# Patient Record
Sex: MALE
[De-identification: names, ages, dates, MRNs, and addresses within clinical notes are randomized per-mention and may not be internally consistent; named-entity substitution may affect disease eponyms.]

---

## 2023-02-08 PROBLEM — Z00.00 ENCOUNTER FOR PREVENTIVE HEALTH EXAMINATION: Status: ACTIVE | Noted: 2023-02-08

## 2023-02-22 ENCOUNTER — APPOINTMENT (OUTPATIENT)
Dept: INTERNAL MEDICINE | Facility: CLINIC | Age: 63
End: 2023-02-22
Payer: MEDICARE

## 2023-02-22 ENCOUNTER — NON-APPOINTMENT (OUTPATIENT)
Age: 63
End: 2023-02-22

## 2023-02-22 VITALS
SYSTOLIC BLOOD PRESSURE: 118 MMHG | RESPIRATION RATE: 17 BRPM | BODY MASS INDEX: 29.25 KG/M2 | HEART RATE: 89 BPM | WEIGHT: 149 LBS | OXYGEN SATURATION: 97 % | DIASTOLIC BLOOD PRESSURE: 70 MMHG | HEIGHT: 60 IN | TEMPERATURE: 98.5 F

## 2023-02-22 DIAGNOSIS — K43.9 VENTRAL HERNIA W/OUT OBSTRUCTION OR GANGRENE: ICD-10-CM

## 2023-02-22 DIAGNOSIS — K40.90 UNILATERAL INGUINAL HERNIA, W/OUT OBSTRUCTION OR GANGRENE, NOT SPECIFIED AS RECURRENT: ICD-10-CM

## 2023-02-22 DIAGNOSIS — Z78.9 OTHER SPECIFIED HEALTH STATUS: ICD-10-CM

## 2023-02-22 PROCEDURE — 99204 OFFICE O/P NEW MOD 45 MIN: CPT | Mod: 25

## 2023-02-22 PROCEDURE — 93000 ELECTROCARDIOGRAM COMPLETE: CPT

## 2023-02-22 RX ORDER — METFORMIN ER 750 MG 750 MG/1
750 TABLET ORAL
Refills: 0 | Status: DISCONTINUED | COMMUNITY

## 2023-02-22 RX ORDER — MULTIVIT-MIN/FOLIC/VIT K/LYCOP 400-300MCG
25 MCG TABLET ORAL DAILY
Qty: 90 | Refills: 1 | Status: ACTIVE | COMMUNITY
Start: 1900-01-01 | End: 1900-01-01

## 2023-02-23 PROBLEM — K43.9 VENTRAL HERNIA: Status: ACTIVE | Noted: 2023-02-23

## 2023-02-23 PROBLEM — Z78.9 DOES NOT USE TOBACCO: Status: ACTIVE | Noted: 2023-02-23

## 2023-02-23 PROBLEM — K40.90 INGUINAL HERNIA: Status: ACTIVE | Noted: 2023-02-23

## 2023-03-08 ENCOUNTER — APPOINTMENT (OUTPATIENT)
Dept: INTERNAL MEDICINE | Facility: CLINIC | Age: 63
End: 2023-03-08
Payer: MEDICARE

## 2023-03-08 VITALS
HEIGHT: 60 IN | DIASTOLIC BLOOD PRESSURE: 82 MMHG | RESPIRATION RATE: 17 BRPM | WEIGHT: 149 LBS | HEART RATE: 81 BPM | OXYGEN SATURATION: 98 % | BODY MASS INDEX: 29.25 KG/M2 | TEMPERATURE: 98.3 F | SYSTOLIC BLOOD PRESSURE: 128 MMHG

## 2023-03-08 DIAGNOSIS — E11.9 TYPE 2 DIABETES MELLITUS W/OUT COMPLICATIONS: ICD-10-CM

## 2023-03-08 DIAGNOSIS — Z12.5 ENCOUNTER FOR SCREENING FOR MALIGNANT NEOPLASM OF PROSTATE: ICD-10-CM

## 2023-03-08 DIAGNOSIS — E55.9 VITAMIN D DEFICIENCY, UNSPECIFIED: ICD-10-CM

## 2023-03-08 DIAGNOSIS — R06.2 WHEEZING: ICD-10-CM

## 2023-03-08 DIAGNOSIS — E03.9 HYPOTHYROIDISM, UNSPECIFIED: ICD-10-CM

## 2023-03-08 DIAGNOSIS — E53.8 DEFICIENCY OF OTHER SPECIFIED B GROUP VITAMINS: ICD-10-CM

## 2023-03-08 PROCEDURE — 99213 OFFICE O/P EST LOW 20 MIN: CPT | Mod: 25

## 2023-03-08 PROCEDURE — 36415 COLL VENOUS BLD VENIPUNCTURE: CPT

## 2023-03-13 PROBLEM — E11.9 DIABETES MELLITUS, TYPE 2: Status: ACTIVE | Noted: 2023-02-22

## 2023-03-13 PROBLEM — R06.2 WHEEZING: Status: ACTIVE | Noted: 2023-03-08

## 2023-03-13 PROBLEM — E03.9 HYPOTHYROIDISM: Status: ACTIVE | Noted: 2023-02-22

## 2023-03-13 PROBLEM — E55.9 VITAMIN D DEFICIENCY: Status: ACTIVE | Noted: 2023-02-22

## 2023-03-13 PROBLEM — E53.8 VITAMIN B12 DEFICIENCY (NON ANEMIC): Status: ACTIVE | Noted: 2023-03-08

## 2023-03-13 NOTE — PLAN
[FreeTextEntry1] : check fasting blood work\par \par RX for albuterol HFA sent to pharmacy electronically

## 2023-03-13 NOTE — PHYSICAL EXAM
[Normal] : normal gait, coordination grossly intact, no focal deficits and deep tendon reflexes were 2+ and symmetric [de-identified] : ilateral inguinal hernias (left larger than right), ventral hernia, diasthesis recti [FreeTextEntry1] : done by Dr. Barrett (gastroenterology)

## 2023-03-13 NOTE — HISTORY OF PRESENT ILLNESS
[FreeTextEntry1] : fasting blood work\par RX refill [FreeTextEntry8] : Pt. is a 64 y/o male presents for fasting blood work not performed at new patient exam. Pt. has diabetes. Pt. hemoglobin A1C has been monitored by patient. Pt. advised that there are certain medications that the patient must be on during diabetes. Pt. takes Metformin b.i.d. Pt. had a hernia. Pt. does to podiatry twice a year in [place]. Pt. never had any eye problems from he diabetes. Pt. denies heart problems. Pt. has no surgical history. Pt. saw Alexis Barrett in [place]. Pt. denies smoking or drinking. Pt. is in real estate. Pt. has been in [place]. Pt. denies family history of medical problems. Pt. father had passed. Pt. is up-to-date with colonoscopy. Pt flu shot received last year. Pt. Zoraida Renee. Pt. advised to get pneumonia shot. Pt. has not Shingles vaccine. Pt. had chickenpox when he was a kid. Pt. PE is normal except for hernia. Pt. advised to have possible hernia repair surgery. Pt. had rectal exam from Dr. Martins. Pt. requests refill on all current medications medications. Pt. medication refills sent to [facility] in [place]. Greater than 50% of the time was spent discussing diet and exercising, taking meds as prescribed, and washing, not touching their face, sneezing in to their elbow, stay home with only mild symptoms, social distancing, and quarantining themselves away from others.\par

## 2023-03-18 LAB
CREAT SPEC-SCNC: 50 MG/DL
MICROALBUMIN 24H UR DL<=1MG/L-MCNC: <1.2 MG/DL
MICROALBUMIN/CREAT 24H UR-RTO: NORMAL MG/G

## 2023-07-09 ENCOUNTER — RX RENEWAL (OUTPATIENT)
Age: 63
End: 2023-07-09

## 2023-07-09 RX ORDER — ALBUTEROL SULFATE 90 UG/1
108 (90 BASE) INHALANT RESPIRATORY (INHALATION)
Qty: 8.5 | Refills: 0 | Status: ACTIVE | COMMUNITY
Start: 2023-03-08 | End: 1900-01-01

## 2023-07-13 LAB
25(OH)D3 SERPL-MCNC: 18.6 NG/ML
ALBUMIN SERPL ELPH-MCNC: 5 G/DL
ALP BLD-CCNC: 45 U/L
ALT SERPL-CCNC: 40 U/L
ANION GAP SERPL CALC-SCNC: 17 MMOL/L
AST SERPL-CCNC: 33 U/L
BILIRUB SERPL-MCNC: 0.7 MG/DL
BUN SERPL-MCNC: 13 MG/DL
CALCIUM SERPL-MCNC: 10.2 MG/DL
CHLORIDE SERPL-SCNC: 101 MMOL/L
CHOLEST SERPL-MCNC: 198 MG/DL
CO2 SERPL-SCNC: 20 MMOL/L
CREAT SERPL-MCNC: 1.1 MG/DL
EGFR: 74 ML/MIN/1.73M2
ESTIMATED AVERAGE GLUCOSE: 166 MG/DL
GLUCOSE SERPL-MCNC: 119 MG/DL
HBA1C MFR BLD HPLC: 7.4 %
HDLC SERPL-MCNC: 41 MG/DL
LDLC SERPL CALC-MCNC: 121 MG/DL
NONHDLC SERPL-MCNC: 158 MG/DL
POTASSIUM SERPL-SCNC: 4.7 MMOL/L
PROT SERPL-MCNC: 7.8 G/DL
PSA SERPL-MCNC: 0.93 NG/ML
SODIUM SERPL-SCNC: 138 MMOL/L
TRIGL SERPL-MCNC: 209 MG/DL
TSH SERPL-ACNC: 3.16 UIU/ML
VIT B12 SERPL-MCNC: 167 PG/ML

## 2023-07-18 ENCOUNTER — NON-APPOINTMENT (OUTPATIENT)
Age: 63
End: 2023-07-18

## 2023-09-17 ENCOUNTER — RX RENEWAL (OUTPATIENT)
Age: 63
End: 2023-09-17

## 2023-09-17 RX ORDER — LOSARTAN POTASSIUM 50 MG/1
50 TABLET, FILM COATED ORAL
Qty: 90 | Refills: 0 | Status: ACTIVE | COMMUNITY
Start: 2023-03-08 | End: 1900-01-01

## 2023-09-17 RX ORDER — METFORMIN HYDROCHLORIDE 500 MG/1
500 TABLET, COATED ORAL TWICE DAILY
Qty: 360 | Refills: 0 | Status: ACTIVE | COMMUNITY
Start: 2023-07-09 | End: 1900-01-01

## 2023-09-17 RX ORDER — LEVOTHYROXINE SODIUM 0.07 MG/1
75 TABLET ORAL
Qty: 90 | Refills: 0 | Status: ACTIVE | COMMUNITY
Start: 2023-07-09 | End: 1900-01-01

## 2023-09-17 RX ORDER — SIMVASTATIN 20 MG/1
20 TABLET, FILM COATED ORAL
Qty: 90 | Refills: 0 | Status: ACTIVE | COMMUNITY
Start: 2023-07-09 | End: 1900-01-01

## 2023-12-10 ENCOUNTER — RX RENEWAL (OUTPATIENT)
Age: 63
End: 2023-12-10

## 2024-01-10 ENCOUNTER — RX RENEWAL (OUTPATIENT)
Age: 64
End: 2024-01-10

## 2024-03-13 ENCOUNTER — OFFICE (OUTPATIENT)
Dept: URBAN - METROPOLITAN AREA CLINIC 109 | Facility: CLINIC | Age: 64
Setting detail: OPHTHALMOLOGY
End: 2024-03-13
Payer: MEDICARE

## 2024-03-13 DIAGNOSIS — H40.1131: ICD-10-CM

## 2024-03-13 DIAGNOSIS — H25.13: ICD-10-CM

## 2024-03-13 DIAGNOSIS — E11.3291: ICD-10-CM

## 2024-03-13 DIAGNOSIS — H52.4: ICD-10-CM

## 2024-03-13 PROCEDURE — 92014 COMPRE OPH EXAM EST PT 1/>: CPT | Performed by: OPHTHALMOLOGY

## 2024-03-13 PROCEDURE — 92133 CPTRZD OPH DX IMG PST SGM ON: CPT | Performed by: OPHTHALMOLOGY

## 2024-03-13 PROCEDURE — 92015 DETERMINE REFRACTIVE STATE: CPT | Performed by: OPHTHALMOLOGY

## 2024-03-13 ASSESSMENT — REFRACTION_MANIFEST
OS_ADD: +2.50
OS_SPHERE: +3.00
OD_VA1: 20/40+2
OD_ADD: +2.50
OD_AXIS: 90
OD_CYLINDER: -0.50
OD_SPHERE: +1.00

## 2024-03-13 ASSESSMENT — REFRACTION_CURRENTRX
OS_OVR_VA: 20/
OD_SPHERE: +3.00
OD_OVR_VA: 20/
OD_CYLINDER: -0.50
OD_ADD: +2.50
OS_ADD: +2.50
OD_AXIS: 90
OS_SPHERE: +3.00

## 2024-03-13 ASSESSMENT — SPHEQUIV_DERIVED: OD_SPHEQUIV: 0.75

## 2024-04-10 ENCOUNTER — RX ONLY (RX ONLY)
Age: 64
End: 2024-04-10

## 2024-04-10 ENCOUNTER — OFFICE (OUTPATIENT)
Dept: URBAN - METROPOLITAN AREA CLINIC 109 | Facility: CLINIC | Age: 64
Setting detail: OPHTHALMOLOGY
End: 2024-04-10
Payer: MEDICARE

## 2024-04-10 DIAGNOSIS — H40.1131: ICD-10-CM

## 2024-04-10 DIAGNOSIS — H25.13: ICD-10-CM

## 2024-04-10 DIAGNOSIS — H40.033: ICD-10-CM

## 2024-04-10 DIAGNOSIS — E11.3291: ICD-10-CM

## 2024-04-10 PROCEDURE — 92020 GONIOSCOPY: CPT | Performed by: OPHTHALMOLOGY

## 2024-04-10 PROCEDURE — 99214 OFFICE O/P EST MOD 30 MIN: CPT | Performed by: OPHTHALMOLOGY

## 2024-06-10 ENCOUNTER — OFFICE (OUTPATIENT)
Dept: URBAN - METROPOLITAN AREA CLINIC 109 | Facility: CLINIC | Age: 64
Setting detail: OPHTHALMOLOGY
End: 2024-06-10
Payer: MEDICARE

## 2024-06-10 DIAGNOSIS — H25.12: ICD-10-CM

## 2024-06-10 DIAGNOSIS — H25.13: ICD-10-CM

## 2024-06-10 DIAGNOSIS — H40.1131: ICD-10-CM

## 2024-06-10 DIAGNOSIS — E11.3291: ICD-10-CM

## 2024-06-10 PROBLEM — H25.11 CATARACT SENILE NUCLEAR SCLEROSIS; RIGHT EYE, LEFT EYE, BOTH EYES: Status: ACTIVE | Noted: 2024-06-10

## 2024-06-10 PROCEDURE — 92136 OPHTHALMIC BIOMETRY: CPT | Mod: LT | Performed by: OPHTHALMOLOGY

## 2024-06-10 PROCEDURE — 92134 CPTRZ OPH DX IMG PST SGM RTA: CPT | Performed by: OPHTHALMOLOGY

## 2024-06-10 PROCEDURE — 99213 OFFICE O/P EST LOW 20 MIN: CPT | Performed by: OPHTHALMOLOGY

## 2024-06-10 ASSESSMENT — CONFRONTATIONAL VISUAL FIELD TEST (CVF)
OS_FINDINGS: FULL
OD_FINDINGS: FULL

## 2024-08-28 ENCOUNTER — OFFICE (OUTPATIENT)
Dept: URBAN - METROPOLITAN AREA CLINIC 109 | Facility: CLINIC | Age: 64
Setting detail: OPHTHALMOLOGY
End: 2024-08-28
Payer: MEDICARE

## 2024-08-28 DIAGNOSIS — H25.12: ICD-10-CM

## 2024-08-28 DIAGNOSIS — H25.11: ICD-10-CM

## 2024-08-28 DIAGNOSIS — H25.13: ICD-10-CM

## 2024-08-28 PROCEDURE — 99213 OFFICE O/P EST LOW 20 MIN: CPT | Performed by: OPHTHALMOLOGY

## 2024-08-28 ASSESSMENT — CONFRONTATIONAL VISUAL FIELD TEST (CVF)
OS_FINDINGS: FULL
OD_FINDINGS: FULL

## 2024-09-03 ENCOUNTER — AMBULATORY SURGERY CENTER (OUTPATIENT)
Dept: URBAN - METROPOLITAN AREA SURGERY 19 | Facility: SURGERY | Age: 64
Setting detail: OPHTHALMOLOGY
End: 2024-09-03
Payer: MEDICARE

## 2024-09-03 DIAGNOSIS — H25.12: ICD-10-CM

## 2024-09-03 DIAGNOSIS — H40.1121: ICD-10-CM

## 2024-09-03 PROCEDURE — 66991 XCAPSL CTRC RMVL INSJ 1+: CPT | Mod: LT | Performed by: OPHTHALMOLOGY

## 2024-09-04 ENCOUNTER — OFFICE (OUTPATIENT)
Dept: URBAN - METROPOLITAN AREA CLINIC 109 | Facility: CLINIC | Age: 64
Setting detail: OPHTHALMOLOGY
End: 2024-09-04
Payer: MEDICARE

## 2024-09-04 DIAGNOSIS — Z96.1: ICD-10-CM

## 2024-09-04 PROCEDURE — 99024 POSTOP FOLLOW-UP VISIT: CPT | Performed by: OPTOMETRIST

## 2024-09-26 ENCOUNTER — OFFICE (OUTPATIENT)
Dept: URBAN - METROPOLITAN AREA CLINIC 109 | Facility: CLINIC | Age: 64
Setting detail: OPHTHALMOLOGY
End: 2024-09-26
Payer: MEDICARE

## 2024-09-26 DIAGNOSIS — Z96.1: ICD-10-CM

## 2024-09-26 DIAGNOSIS — H25.13: ICD-10-CM

## 2024-09-26 DIAGNOSIS — H25.11: ICD-10-CM

## 2024-09-26 PROCEDURE — 99213 OFFICE O/P EST LOW 20 MIN: CPT | Mod: 24 | Performed by: OPHTHALMOLOGY

## 2024-09-26 PROCEDURE — 99024 POSTOP FOLLOW-UP VISIT: CPT | Performed by: OPHTHALMOLOGY

## 2024-09-26 PROCEDURE — 92136 OPHTHALMIC BIOMETRY: CPT | Mod: RT | Performed by: OPHTHALMOLOGY

## 2024-09-26 ASSESSMENT — CONFRONTATIONAL VISUAL FIELD TEST (CVF)
OS_FINDINGS: FULL
OD_FINDINGS: FULL

## 2024-11-26 ENCOUNTER — OFFICE (OUTPATIENT)
Dept: URBAN - METROPOLITAN AREA CLINIC 109 | Facility: CLINIC | Age: 64
Setting detail: OPHTHALMOLOGY
End: 2024-11-26
Payer: MEDICARE

## 2024-11-26 DIAGNOSIS — H25.11: ICD-10-CM

## 2024-11-26 DIAGNOSIS — H40.1131: ICD-10-CM

## 2024-11-26 PROCEDURE — 99214 OFFICE O/P EST MOD 30 MIN: CPT | Mod: 57 | Performed by: OPHTHALMOLOGY

## 2024-11-26 PROCEDURE — 92020 GONIOSCOPY: CPT | Performed by: OPHTHALMOLOGY

## 2024-11-26 ASSESSMENT — PACHYMETRY
OD_CT_UM: 515
OS_CT_CORRECTION: 2
OD_CT_CORRECTION: 2
OS_CT_UM: 515

## 2024-11-26 ASSESSMENT — KERATOMETRY
OD_K1POWER_DIOPTERS: 42.25
OS_AXISANGLE_DEGREES: 039
OS_K1POWER_DIOPTERS: 42.25
OD_AXISANGLE_DEGREES: 162
OD_K2POWER_DIOPTERS: 42.50
OS_K2POWER_DIOPTERS: 42.50

## 2024-11-26 ASSESSMENT — REFRACTION_CURRENTRX
OD_SPHERE: +3.00
OD_AXIS: 90
OS_OVR_VA: 20/
OS_ADD: +2.50
OD_OVR_VA: 20/
OD_CYLINDER: -0.50
OD_ADD: +2.50
OS_SPHERE: +3.00

## 2024-11-26 ASSESSMENT — REFRACTION_MANIFEST
OD_ADD: +2.50
OD_SPHERE: +1.00
OD_AXIS: 090
OD_CYLINDER: -0.50
OD_AXIS: 90
OD_SPHERE: +1.00
OD_CYLINDER: -0.50
OD_VA1: 20/40+2
OS_ADD: +2.50
OS_SPHERE: +3.00
OD_VA1: 20/NI

## 2024-11-26 ASSESSMENT — TONOMETRY
OS_IOP_MMHG: 15
OD_IOP_MMHG: 15

## 2024-11-26 ASSESSMENT — REFRACTION_AUTOREFRACTION
OD_AXIS: 085
OS_AXIS: 090
OD_SPHERE: +0.50
OS_SPHERE: +0.50
OD_CYLINDER: -0.50
OS_CYLINDER: -0.50

## 2024-11-26 ASSESSMENT — VISUAL ACUITY
OS_BCVA: 20/40
OD_BCVA: 20/20-1

## 2024-11-26 ASSESSMENT — CONFRONTATIONAL VISUAL FIELD TEST (CVF)
OS_FINDINGS: FULL
OD_FINDINGS: FULL

## 2024-12-04 ENCOUNTER — RX ONLY (RX ONLY)
Age: 64
End: 2024-12-04

## 2024-12-06 ENCOUNTER — RX ONLY (RX ONLY)
Age: 64
End: 2024-12-06

## 2024-12-06 RX ORDER — KETOROLAC TROMETHAMINE 5 MG/ML
SOLUTION/ DROPS OPHTHALMIC
Qty: 5 | Refills: 3 | Status: ACTIVE | OUTPATIENT

## 2024-12-10 ENCOUNTER — AMBULATORY SURGERY CENTER (OUTPATIENT)
Dept: URBAN - METROPOLITAN AREA SURGERY 19 | Facility: SURGERY | Age: 64
Setting detail: OPHTHALMOLOGY
End: 2024-12-10
Payer: MEDICARE

## 2024-12-10 DIAGNOSIS — H25.11: ICD-10-CM

## 2024-12-10 DIAGNOSIS — H40.1111: ICD-10-CM

## 2024-12-10 PROCEDURE — 66991 XCAPSL CTRC RMVL INSJ 1+: CPT | Mod: RT | Performed by: OPHTHALMOLOGY

## 2024-12-11 ENCOUNTER — OFFICE (OUTPATIENT)
Dept: URBAN - METROPOLITAN AREA CLINIC 109 | Facility: CLINIC | Age: 64
Setting detail: OPHTHALMOLOGY
End: 2024-12-11
Payer: MEDICARE

## 2024-12-11 ENCOUNTER — RX ONLY (RX ONLY)
Age: 64
End: 2024-12-11

## 2024-12-11 DIAGNOSIS — Z96.1: ICD-10-CM

## 2024-12-11 PROCEDURE — 99024 POSTOP FOLLOW-UP VISIT: CPT | Performed by: OPTOMETRIST

## 2024-12-11 ASSESSMENT — VISUAL ACUITY
OS_BCVA: 20/40
OD_BCVA: 20/20-2

## 2024-12-11 ASSESSMENT — REFRACTION_MANIFEST
OS_ADD: +2.50
OD_VA1: 20/40+2
OD_AXIS: 090
OD_SPHERE: +1.00
OD_AXIS: 90
OD_CYLINDER: -0.50
OD_ADD: +2.50
OS_SPHERE: +3.00
OD_VA1: 20/NI
OD_CYLINDER: -0.50
OD_SPHERE: +1.00

## 2024-12-11 ASSESSMENT — KERATOMETRY
OS_AXISANGLE_DEGREES: 039
OD_AXISANGLE_DEGREES: 162
OS_K1POWER_DIOPTERS: 42.25
OD_K1POWER_DIOPTERS: 42.25
OS_K2POWER_DIOPTERS: 42.50
OD_K2POWER_DIOPTERS: 42.50

## 2024-12-11 ASSESSMENT — PACHYMETRY
OD_CT_CORRECTION: 2
OS_CT_UM: 515
OS_CT_CORRECTION: 2
OD_CT_UM: 515

## 2024-12-11 ASSESSMENT — REFRACTION_CURRENTRX
OS_SPHERE: +3.00
OS_OVR_VA: 20/
OS_ADD: +2.50
OD_AXIS: 90
OD_CYLINDER: -0.50
OD_SPHERE: +3.00
OD_ADD: +2.50
OD_OVR_VA: 20/

## 2024-12-11 ASSESSMENT — REFRACTION_AUTOREFRACTION
OD_AXIS: 035
OS_AXIS: 142
OS_SPHERE: -0.25
OD_CYLINDER: -0.50
OD_SPHERE: +0.50
OS_CYLINDER: -0.25

## 2024-12-11 ASSESSMENT — TONOMETRY
OS_IOP_MMHG: 12
OD_IOP_MMHG: 12

## 2025-01-02 ENCOUNTER — OFFICE (OUTPATIENT)
Dept: URBAN - METROPOLITAN AREA CLINIC 109 | Facility: CLINIC | Age: 65
Setting detail: OPHTHALMOLOGY
End: 2025-01-02
Payer: MEDICARE

## 2025-01-02 ENCOUNTER — RX ONLY (RX ONLY)
Age: 65
End: 2025-01-02

## 2025-01-02 DIAGNOSIS — Z96.1: ICD-10-CM

## 2025-01-02 PROCEDURE — 99024 POSTOP FOLLOW-UP VISIT: CPT | Performed by: OPTOMETRIST

## 2025-01-02 RX ORDER — TIMOLOL MALEATE 5 MG/ML
SOLUTION/ DROPS OPHTHALMIC
Qty: 7.5 | Refills: 4 | Status: ACTIVE | OUTPATIENT

## 2025-01-02 ASSESSMENT — PACHYMETRY
OS_CT_CORRECTION: 2
OD_CT_UM: 515
OD_CT_CORRECTION: 2
OS_CT_UM: 515

## 2025-01-02 ASSESSMENT — REFRACTION_MANIFEST
OS_ADD: +2.50
OD_ADD: +2.50
OD_SPHERE: PLANO
OS_SPHERE: +3.00
OD_AXIS: 90
OS_ADD: +2.50
OS_SPHERE: PLANO
OD_CYLINDER: -0.50
OD_VA1: 20/40+2
OD_ADD: +2.50
OD_SPHERE: +1.00

## 2025-01-02 ASSESSMENT — REFRACTION_AUTOREFRACTION
OD_SPHERE: +0.25
OS_AXIS: 95
OS_SPHERE: +0.25
OS_CYLINDER: -0.25
OD_AXIS: 57
OD_CYLINDER: -0.50

## 2025-01-02 ASSESSMENT — REFRACTION_CURRENTRX
OS_ADD: +2.50
OD_AXIS: 90
OD_SPHERE: +3.00
OD_ADD: +2.50
OS_SPHERE: +3.00
OD_OVR_VA: 20/
OS_OVR_VA: 20/
OD_CYLINDER: -0.50

## 2025-01-02 ASSESSMENT — VISUAL ACUITY
OD_BCVA: 20/20
OS_BCVA: 20/20

## 2025-01-02 ASSESSMENT — KERATOMETRY
OS_K2POWER_DIOPTERS: 42.50
OS_AXISANGLE_DEGREES: 039
OD_K2POWER_DIOPTERS: 42.50
OS_K1POWER_DIOPTERS: 42.25
OD_AXISANGLE_DEGREES: 162
OD_K1POWER_DIOPTERS: 42.25

## 2025-01-02 ASSESSMENT — TONOMETRY
OS_IOP_MMHG: 16
OD_IOP_MMHG: 15

## 2025-05-02 ENCOUNTER — OFFICE (OUTPATIENT)
Dept: URBAN - METROPOLITAN AREA CLINIC 109 | Facility: CLINIC | Age: 65
Setting detail: OPHTHALMOLOGY
End: 2025-05-02
Payer: MEDICARE

## 2025-05-02 VITALS — HEIGHT: 60 IN

## 2025-05-02 DIAGNOSIS — H40.033: ICD-10-CM

## 2025-05-02 DIAGNOSIS — H40.1131: ICD-10-CM

## 2025-05-02 PROCEDURE — DEFER/DELA DEFER/DELAY 30 DAY: Performed by: OPTOMETRIST

## 2025-05-02 PROCEDURE — 99213 OFFICE O/P EST LOW 20 MIN: CPT | Performed by: OPTOMETRIST

## 2025-05-02 ASSESSMENT — REFRACTION_CURRENTRX
OS_ADD: +2.50
OD_ADD: +2.50
OD_SPHERE: +3.00
OS_SPHERE: +3.00
OD_CYLINDER: -0.50
OD_AXIS: 90
OD_OVR_VA: 20/
OS_OVR_VA: 20/

## 2025-05-02 ASSESSMENT — TONOMETRY
OS_IOP_MMHG: 14
OD_IOP_MMHG: 14

## 2025-05-02 ASSESSMENT — PACHYMETRY
OD_CT_CORRECTION: 2
OS_CT_UM: 515
OD_CT_UM: 515
OS_CT_CORRECTION: 2

## 2025-05-02 ASSESSMENT — REFRACTION_AUTOREFRACTION
OD_SPHERE: +0.25
OD_AXIS: 57
OS_CYLINDER: -0.25
OS_SPHERE: +0.25
OD_CYLINDER: -0.50
OS_AXIS: 95

## 2025-05-02 ASSESSMENT — REFRACTION_MANIFEST
OS_SPHERE: PLANO
OD_SPHERE: +1.00
OD_ADD: +2.50
OD_CYLINDER: -0.50
OD_SPHERE: PLANO
OD_ADD: +2.50
OD_VA1: 20/40+2
OS_ADD: +2.50
OD_AXIS: 90
OS_SPHERE: +3.00
OS_ADD: +2.50

## 2025-05-02 ASSESSMENT — KERATOMETRY
OS_AXISANGLE_DEGREES: 039
OD_AXISANGLE_DEGREES: 162
OD_K1POWER_DIOPTERS: 42.25
OS_K2POWER_DIOPTERS: 42.50
OS_K1POWER_DIOPTERS: 42.25
OD_K2POWER_DIOPTERS: 42.50

## 2025-05-02 ASSESSMENT — VISUAL ACUITY
OD_BCVA: 20/20
OS_BCVA: 20/20

## 2025-05-02 ASSESSMENT — CONFRONTATIONAL VISUAL FIELD TEST (CVF)
OS_FINDINGS: FULL
OD_FINDINGS: FULL